# Patient Record
Sex: FEMALE | Race: WHITE | URBAN - METROPOLITAN AREA
[De-identification: names, ages, dates, MRNs, and addresses within clinical notes are randomized per-mention and may not be internally consistent; named-entity substitution may affect disease eponyms.]

---

## 2017-01-01 ENCOUNTER — APPOINTMENT (OUTPATIENT)
Dept: GENERAL RADIOLOGY | Facility: CLINIC | Age: 82
End: 2017-01-01
Attending: EMERGENCY MEDICINE
Payer: MEDICARE

## 2017-01-01 ENCOUNTER — HOSPITAL ENCOUNTER (OUTPATIENT)
Facility: CLINIC | Age: 82
Setting detail: OBSERVATION
Discharge: HOME | End: 2017-02-15
Attending: EMERGENCY MEDICINE | Admitting: HOSPITALIST
Payer: MEDICARE

## 2017-01-01 VITALS
RESPIRATION RATE: 16 BRPM | TEMPERATURE: 97.8 F | DIASTOLIC BLOOD PRESSURE: 83 MMHG | SYSTOLIC BLOOD PRESSURE: 117 MMHG | OXYGEN SATURATION: 97 %

## 2017-01-01 DIAGNOSIS — N39.0 URINARY TRACT INFECTION, SITE UNSPECIFIED: ICD-10-CM

## 2017-01-01 DIAGNOSIS — R62.7 FAILURE TO THRIVE IN ADULT: ICD-10-CM

## 2017-01-01 DIAGNOSIS — E87.8 HYPERCHLOREMIA: ICD-10-CM

## 2017-01-01 DIAGNOSIS — E87.0 HYPERNATREMIA: ICD-10-CM

## 2017-01-01 LAB
ALBUMIN UR-MCNC: 100 MG/DL
ANION GAP SERPL CALCULATED.3IONS-SCNC: 14 MMOL/L (ref 3–14)
APPEARANCE UR: ABNORMAL
BACTERIA #/AREA URNS HPF: ABNORMAL /HPF
BASOPHILS # BLD AUTO: 0 10E9/L (ref 0–0.2)
BASOPHILS NFR BLD AUTO: 0 %
BILIRUB UR QL STRIP: ABNORMAL
BUN SERPL-MCNC: 45 MG/DL (ref 7–30)
CALCIUM SERPL-MCNC: 8.4 MG/DL (ref 8.5–10.1)
CHLORIDE SERPL-SCNC: 116 MMOL/L (ref 94–109)
CO2 SERPL-SCNC: 29 MMOL/L (ref 20–32)
COLOR UR AUTO: ABNORMAL
CREAT SERPL-MCNC: 1.03 MG/DL (ref 0.52–1.04)
DIFFERENTIAL METHOD BLD: ABNORMAL
EOSINOPHIL # BLD AUTO: 0 10E9/L (ref 0–0.7)
EOSINOPHIL NFR BLD AUTO: 0 %
ERYTHROCYTE [DISTWIDTH] IN BLOOD BY AUTOMATED COUNT: 17.5 % (ref 10–15)
GFR SERPL CREATININE-BSD FRML MDRD: 50 ML/MIN/1.7M2
GLUCOSE SERPL-MCNC: 130 MG/DL (ref 70–99)
GLUCOSE UR STRIP-MCNC: NEGATIVE MG/DL
HCT VFR BLD AUTO: 48.7 % (ref 35–47)
HGB BLD-MCNC: 16.2 G/DL (ref 11.7–15.7)
HGB UR QL STRIP: ABNORMAL
IMM GRANULOCYTES # BLD: 0.1 10E9/L (ref 0–0.4)
IMM GRANULOCYTES NFR BLD: 0.4 %
KETONES UR STRIP-MCNC: 15 MG/DL
LEUKOCYTE ESTERASE UR QL STRIP: ABNORMAL
LYMPHOCYTES # BLD AUTO: 1.2 10E9/L (ref 0.8–5.3)
LYMPHOCYTES NFR BLD AUTO: 6.9 %
MCH RBC QN AUTO: 32.7 PG (ref 26.5–33)
MCHC RBC AUTO-ENTMCNC: 33.3 G/DL (ref 31.5–36.5)
MCV RBC AUTO: 98 FL (ref 78–100)
MONOCYTES # BLD AUTO: 0.4 10E9/L (ref 0–1.3)
MONOCYTES NFR BLD AUTO: 2.5 %
MUCOUS THREADS #/AREA URNS LPF: PRESENT /LPF
NEUTROPHILS # BLD AUTO: 15 10E9/L (ref 1.6–8.3)
NEUTROPHILS NFR BLD AUTO: 90.2 %
NITRATE UR QL: NEGATIVE
NRBC # BLD AUTO: 0 10*3/UL
NRBC BLD AUTO-RTO: 0 /100
PH UR STRIP: 5.5 PH (ref 5–7)
PLATELET # BLD AUTO: 230 10E9/L (ref 150–450)
POTASSIUM SERPL-SCNC: 3.5 MMOL/L (ref 3.4–5.3)
RBC # BLD AUTO: 4.96 10E12/L (ref 3.8–5.2)
RBC #/AREA URNS AUTO: ABNORMAL /HPF (ref 0–2)
SODIUM SERPL-SCNC: 159 MMOL/L (ref 133–144)
SP GR UR STRIP: 1.02 (ref 1–1.03)
URN SPEC COLLECT METH UR: ABNORMAL
UROBILINOGEN UR STRIP-ACNC: 4 EU/DL (ref 0.2–1)
WBC # BLD AUTO: 16.6 10E9/L (ref 4–11)
WBC #/AREA URNS AUTO: ABNORMAL /HPF (ref 0–2)

## 2017-01-01 PROCEDURE — 96360 HYDRATION IV INFUSION INIT: CPT

## 2017-01-01 PROCEDURE — 96361 HYDRATE IV INFUSION ADD-ON: CPT

## 2017-01-01 PROCEDURE — 81001 URINALYSIS AUTO W/SCOPE: CPT | Performed by: EMERGENCY MEDICINE

## 2017-01-01 PROCEDURE — 99217 ZZC OBSERVATION CARE DISCHARGE: CPT | Performed by: HOSPITALIST

## 2017-01-01 PROCEDURE — 85025 COMPLETE CBC W/AUTO DIFF WBC: CPT | Performed by: EMERGENCY MEDICINE

## 2017-01-01 PROCEDURE — 99285 EMERGENCY DEPT VISIT HI MDM: CPT | Mod: 25

## 2017-01-01 PROCEDURE — 71020 XR CHEST 2 VW: CPT

## 2017-01-01 PROCEDURE — A9270 NON-COVERED ITEM OR SERVICE: HCPCS | Mod: GY | Performed by: INTERNAL MEDICINE

## 2017-01-01 PROCEDURE — 25000132 ZZH RX MED GY IP 250 OP 250 PS 637: Mod: GY | Performed by: INTERNAL MEDICINE

## 2017-01-01 PROCEDURE — 25000128 H RX IP 250 OP 636: Performed by: INTERNAL MEDICINE

## 2017-01-01 PROCEDURE — 99220 ZZC INITIAL OBSERVATION CARE,LEVL III: CPT | Performed by: INTERNAL MEDICINE

## 2017-01-01 PROCEDURE — 93005 ELECTROCARDIOGRAM TRACING: CPT

## 2017-01-01 PROCEDURE — 80048 BASIC METABOLIC PNL TOTAL CA: CPT | Performed by: EMERGENCY MEDICINE

## 2017-01-01 PROCEDURE — 25000128 H RX IP 250 OP 636: Performed by: EMERGENCY MEDICINE

## 2017-01-01 PROCEDURE — G0378 HOSPITAL OBSERVATION PER HR: HCPCS

## 2017-01-01 RX ORDER — LORAZEPAM 0.5 MG/1
.5-1 TABLET ORAL
Status: DISCONTINUED | OUTPATIENT
Start: 2017-01-01 | End: 2017-01-01 | Stop reason: HOSPADM

## 2017-01-01 RX ORDER — SODIUM CHLORIDE 9 MG/ML
INJECTION, SOLUTION INTRAVENOUS CONTINUOUS
Status: DISCONTINUED | OUTPATIENT
Start: 2017-01-01 | End: 2017-01-01 | Stop reason: HOSPADM

## 2017-01-01 RX ORDER — SODIUM CHLORIDE 9 MG/ML
INJECTION, SOLUTION INTRAVENOUS ONCE
Status: COMPLETED | OUTPATIENT
Start: 2017-01-01 | End: 2017-01-01

## 2017-01-01 RX ORDER — FLUTICASONE PROPIONATE 50 MCG
1 SPRAY, SUSPENSION (ML) NASAL EVERY EVENING
Status: ON HOLD | COMMUNITY
End: 2017-01-01

## 2017-01-01 RX ORDER — ONDANSETRON 2 MG/ML
4 INJECTION INTRAMUSCULAR; INTRAVENOUS EVERY 6 HOURS PRN
Status: DISCONTINUED | OUTPATIENT
Start: 2017-01-01 | End: 2017-01-01 | Stop reason: HOSPADM

## 2017-01-01 RX ORDER — ONDANSETRON 4 MG/1
4 TABLET, ORALLY DISINTEGRATING ORAL EVERY 6 HOURS PRN
Status: DISCONTINUED | OUTPATIENT
Start: 2017-01-01 | End: 2017-01-01 | Stop reason: HOSPADM

## 2017-01-01 RX ORDER — ATROPINE SULFATE 10 MG/ML
1-2 SOLUTION/ DROPS OPHTHALMIC
Qty: 1 BOTTLE | DISCHARGE
Start: 2017-01-01

## 2017-01-01 RX ORDER — OLANZAPINE 5 MG/1
5 TABLET, ORALLY DISINTEGRATING ORAL EVERY 6 HOURS PRN
Qty: 30 TABLET | Refills: 0 | Status: SHIPPED | OUTPATIENT
Start: 2017-01-01

## 2017-01-01 RX ORDER — HYDROMORPHONE HYDROCHLORIDE 1 MG/ML
0.2 INJECTION, SOLUTION INTRAMUSCULAR; INTRAVENOUS; SUBCUTANEOUS
Status: DISCONTINUED | OUTPATIENT
Start: 2017-01-01 | End: 2017-01-01 | Stop reason: HOSPADM

## 2017-01-01 RX ORDER — BISACODYL 10 MG
10 SUPPOSITORY, RECTAL RECTAL
Qty: 30 SUPPOSITORY | DISCHARGE
Start: 2017-01-01

## 2017-01-01 RX ORDER — NALOXONE HYDROCHLORIDE 0.4 MG/ML
.1-.4 INJECTION, SOLUTION INTRAMUSCULAR; INTRAVENOUS; SUBCUTANEOUS
Status: DISCONTINUED | OUTPATIENT
Start: 2017-01-01 | End: 2017-01-01 | Stop reason: HOSPADM

## 2017-01-01 RX ORDER — BISACODYL 10 MG
10 SUPPOSITORY, RECTAL RECTAL
Status: DISCONTINUED | OUTPATIENT
Start: 2017-01-01 | End: 2017-01-01 | Stop reason: HOSPADM

## 2017-01-01 RX ORDER — ONDANSETRON 4 MG/1
4 TABLET, ORALLY DISINTEGRATING ORAL EVERY 8 HOURS PRN
COMMUNITY

## 2017-01-01 RX ORDER — ACETAMINOPHEN 650 MG/1
650 SUPPOSITORY RECTAL EVERY 6 HOURS PRN
Status: DISCONTINUED | OUTPATIENT
Start: 2017-01-01 | End: 2017-01-01 | Stop reason: HOSPADM

## 2017-01-01 RX ORDER — POLYETHYLENE GLYCOL 3350 17 G/17G
17 POWDER, FOR SOLUTION ORAL PRN
COMMUNITY

## 2017-01-01 RX ORDER — ATROPINE SULFATE 10 MG/ML
1-2 SOLUTION/ DROPS OPHTHALMIC
Status: DISCONTINUED | OUTPATIENT
Start: 2017-01-01 | End: 2017-01-01 | Stop reason: HOSPADM

## 2017-01-01 RX ORDER — CALCIUM CARBONATE 500 MG/1
1 TABLET, CHEWABLE ORAL PRN
Status: ON HOLD | COMMUNITY
End: 2017-01-01

## 2017-01-01 RX ORDER — LORAZEPAM 2 MG/ML
.5-1 INJECTION INTRAMUSCULAR
Status: DISCONTINUED | OUTPATIENT
Start: 2017-01-01 | End: 2017-01-01 | Stop reason: HOSPADM

## 2017-01-01 RX ORDER — ACETAMINOPHEN 325 MG/1
650 TABLET ORAL EVERY 6 HOURS PRN
Status: DISCONTINUED | OUTPATIENT
Start: 2017-01-01 | End: 2017-01-01 | Stop reason: HOSPADM

## 2017-01-01 RX ORDER — SENNOSIDES 8.6 MG
1 TABLET ORAL EVERY EVENING
COMMUNITY

## 2017-01-01 RX ORDER — ACETAMINOPHEN 650 MG/1
650 SUPPOSITORY RECTAL EVERY 6 HOURS PRN
Qty: 60 SUPPOSITORY | DISCHARGE
Start: 2017-01-01

## 2017-01-01 RX ORDER — ACETAMINOPHEN 325 MG/1
650 TABLET ORAL EVERY 6 HOURS PRN
Qty: 100 TABLET | DISCHARGE
Start: 2017-01-01

## 2017-01-01 RX ORDER — OLANZAPINE 5 MG/1
5 TABLET, ORALLY DISINTEGRATING ORAL EVERY 6 HOURS PRN
DISCHARGE
Start: 2017-01-01 | End: 2017-01-01

## 2017-01-01 RX ADMIN — SODIUM CHLORIDE: 9 INJECTION, SOLUTION INTRAVENOUS at 21:49

## 2017-01-01 RX ADMIN — SODIUM CHLORIDE: 9 INJECTION, SOLUTION INTRAVENOUS at 18:26

## 2017-01-01 RX ADMIN — LORAZEPAM 0.5 MG: 0.5 TABLET ORAL at 12:16

## 2017-01-01 ASSESSMENT — ACTIVITIES OF DAILY LIVING (ADL)
AMBULATION: 1-->ASSISTIVE EQUIPMENT
DRESS: 2-->ASSISTIVE PERSON
BATHING: 2-->ASSISTIVE PERSON
TOILETING: 2-->ASSISTIVE PERSON
NUMBER_OF_TIMES_PATIENT_HAS_FALLEN_WITHIN_LAST_SIX_MONTHS: 1
COGNITION: 2 - DIFFICULTY WITH ORGANIZING THOUGHTS
FALL_HISTORY_WITHIN_LAST_SIX_MONTHS: YES
TRANSFERRING: 2-->ASSISTIVE PERSON
RETIRED_COMMUNICATION: 2-->DIFFICULTY UNDERSTANDING AND SPEAKING (NOT RELATED TO LANGUAGE BARRIER)
RETIRED_EATING: 2-->ASSISTIVE PERSON
SWALLOWING: 2-->DIFFICULTY SWALLOWING LIQUIDS/FOODS

## 2017-01-01 ASSESSMENT — ENCOUNTER SYMPTOMS
WEAKNESS: 1
NAUSEA: 1
APPETITE CHANGE: 1
VOMITING: 1

## 2017-02-14 NOTE — IP AVS SNAPSHOT
[facility] [address] [facility]    [address] [place] [address]    Phone:  [phone]                                       After Visit Summary   2/14/2017    Jovanna Andrew    MRN: [id number]           After Visit Summary Signature Page     I have received my discharge instructions, and my questions have been answered. I have discussed any challenges I see with this plan with the nurse or doctor.    ..........................................................................................................................................  Patient/Patient Representative Signature      ..........................................................................................................................................  Patient Representative Print Name and Relationship to Patient    ..................................................               ................................................  Date                                            Time    ..........................................................................................................................................  Reviewed by Signature/Title    ...................................................              ..............................................  Date                                                            Time

## 2017-02-14 NOTE — IP AVS SNAPSHOT
MRN:[id number]                      After Visit Summary   2/14/2017    Jovanna Andrew    MRN: [id number]           Thank you!     Thank you for choosing [facility] for your care. Our goal is always to provide you with excellent care. Hearing back from our patients is one way we can continue to improve our services. Please take a few minutes to complete the written survey that you may receive in the mail after you visit with us. Thank you!        Patient Information     Date Of Birth          11/20/1924        About your hospital stay     You were admitted on:  February 15, 2017 You last received care in the:  [facility] [address] [facility]    You were discharged on:  February 15, 2017        Reason for your hospital stay       Hospice                  Who to Call     For medical emergencies, please call 911.  For non-urgent questions about your medical care, please call your primary care provider or clinic, None          Attending Provider     Provider Specialty    Hira Carlos MD Emergency Medicine    Charles Cai MD Internal Medicine    Alfredito, Rashad Pearce DO Internal Medicine       Primary Care Provider    None       No address on file        After Care Instructions     Activity - Up with nursing assistance           Advance Diet as Tolerated       Follow this diet upon discharge: As tolerated.            Fall precautions           General info for SNF       Length of Stay Estimate: Long Term Care  Condition at Discharge: Terminal  Level of care:board and care  Rehabilitation Potential: Poor  Admission H&P remains valid and up-to-date: Yes  Recent Chemotherapy: N/A  Use Nursing Home Standing Orders: No            Mantoux instructions       Give two-step Mantoux (PPD) Per Facility Policy Yes                  Follow-up Appointments     Follow Up and recommended labs and tests       Follow up with hospice, as directed.                  Pending Results     No orders  "found for last 3 day(s).            Statement of Approval     Ordered          02/15/17 1134  I have reviewed and agree with all the recommendations and orders detailed in this document.  EFFECTIVE NOW     Approved and electronically signed by:  Rashad Glaser DO             Admission Information     Date & Time Provider Department Dept. Phone    2017 Rashad Glaser DO [facility] [address] [facility] [phone]      Your Vitals Were     Blood Pressure Temperature Respirations Pulse Oximetry          117/83 (BP Location: Right arm) 97.8  F (36.6  C) (Axillary) 16 97%        MyChart Information     [facility] lets you send messages to your doctor, view your test results, renew your prescriptions, schedule appointments and more. To sign up, go to [email or web address] . Click on \"Log in\" on the left side of the screen, which will take you to the Welcome page. Then click on \"Sign up Now\" on the right side of the page.     You will be asked to enter the access code listed below, as well as some personal information. Please follow the directions to create your username and password.     Your access code is: [email or web address]  Expires: 2017 11:48 AM     Your access code will  in 90 days. If you need help or a new code, please call your [facility] clinic or [phone].        Care EveryWhere ID     This is your Care EveryWhere ID. This could be used by other organizations to access your [facility] medical records  [id number]           Review of your medicines      START taking        Dose / Directions    * acetaminophen 650 MG Suppository   Commonly known as:  TYLENOL   Used for:  Hypernatremia   Replaces:  TYLENOL PO        Dose:  650 mg   Place 1 suppository (650 mg) rectally every 6 hours as needed for mild pain or fever (temperature over 100? F )   Quantity:  60 suppository   Refills:  0       * acetaminophen 325 MG tablet   Commonly known as:  TYLENOL   Used for:  Hypernatremia "        Dose:  650 mg   Take 2 tablets (650 mg) by mouth every 6 hours as needed for mild pain or fever (temperature over 100? F )   Quantity:  100 tablet   Refills:  0       artificial saliva Soln solution   Used for:  Hypernatremia        Dose:  2 spray   Take 2 mLs (2 sprays) by mouth every hour as needed for dry mouth   Refills:  0       atropine 1 % ophthalmic solution   Used for:  Hypernatremia        Dose:  1-2 drop   Place 1-2 drops under the tongue every hour as needed for other (secretions)   Quantity:  1 Bottle   Refills:  0       bisacodyl 10 MG Suppository   Commonly known as:  DULCOLAX   Used for:  Hypernatremia        Dose:  10 mg   Start taking on:  2/18/2017   Place 1 suppository (10 mg) rectally once as needed for other (for no bowel movement for 72 hours)   Quantity:  30 suppository   Refills:  0       hypromellose-dextran Soln ophthalmic solution   Used for:  Hypernatremia        Dose:  1-2 drop   Place 1-2 drops into both eyes every 8 hours as needed for dry eyes   Refills:  0       OLANZapine zydis 5 MG ODT tab   Commonly known as:  zyPREXA   Used for:  Hypernatremia        Dose:  5 mg   Place 1 tablet (5 mg) under the tongue every 6 hours as needed for other (agitation/delirium/nausea)   Quantity:  30 tablet   Refills:  0       oxyCODONE 20 mg/mL (HIGH CONC) solution   Commonly known as:  ROXICODONE-INTENSOL   Used for:  Hypernatremia        Dose:  5-7.5 mg   Place 0.25-0.375 mLs (5-7.5 mg) under the tongue every 2 hours as needed for moderate to severe pain (or dyspnea)   Quantity:  30 mL   Refills:  0       * Notice:  This list has 2 medication(s) that are the same as other medications prescribed for you. Read the directions carefully, and ask your doctor or other care provider to review them with you.      CONTINUE these medicines which have NOT CHANGED        Dose / Directions    ondansetron 4 MG ODT tab   Commonly known as:  ZOFRAN-ODT        Dose:  4 mg   Take 4 mg by mouth every 8 hours  as needed for nausea   Refills:  0       polyethylene glycol Packet   Commonly known as:  MIRALAX/GLYCOLAX        Dose:  17 g   Take 17 g by mouth as needed for constipation   Refills:  0       sennosides 8.6 MG tablet   Commonly known as:  SENOKOT        Dose:  1 tablet   Take 1 tablet by mouth every evening   Refills:  0         STOP taking     calcium carbonate 500 MG chewable tablet   Commonly known as:  TUMS           CITALOPRAM HYDROBROMIDE PO           fluticasone 50 MCG/ACT spray   Commonly known as:  FLONASE           OMEPRAZOLE PO           PRESERVISION AREDS PO           TYLENOL PO   Replaced by:  acetaminophen 650 MG Suppository                Where to get your medicines      Some of these will need a paper prescription and others can be bought over the counter. Ask your nurse if you have questions.     Bring a paper prescription for each of these medications     OLANZapine zydis 5 MG ODT tab    oxyCODONE 20 mg/mL (HIGH CONC) solution       You don't need a prescription for these medications     acetaminophen 325 MG tablet    acetaminophen 650 MG Suppository    artificial saliva Soln solution    atropine 1 % ophthalmic solution    bisacodyl 10 MG Suppository    hypromellose-dextran Soln ophthalmic solution                Protect others around you: Learn how to safely use, store and throw away your medicines at www.disposemymeds.org.             Medication List: This is a list of all your medications and when to take them. Check marks below indicate your daily home schedule. Keep this list as a reference.      Medications           Morning Afternoon Evening Bedtime As Needed    * acetaminophen 650 MG Suppository   Commonly known as:  TYLENOL   Place 1 suppository (650 mg) rectally every 6 hours as needed for mild pain or fever (temperature over 100? F )                                * acetaminophen 325 MG tablet   Commonly known as:  TYLENOL   Take 2 tablets (650 mg) by mouth every 6 hours as needed for  mild pain or fever (temperature over 100? F )                                artificial saliva Soln solution   Take 2 mLs (2 sprays) by mouth every hour as needed for dry mouth                                atropine 1 % ophthalmic solution   Place 1-2 drops under the tongue every hour as needed for other (secretions)                                bisacodyl 10 MG Suppository   Commonly known as:  DULCOLAX   Place 1 suppository (10 mg) rectally once as needed for other (for no bowel movement for 72 hours)   Start taking on:  2/18/2017                                hypromellose-dextran Soln ophthalmic solution   Place 1-2 drops into both eyes every 8 hours as needed for dry eyes                                OLANZapine zydis 5 MG ODT tab   Commonly known as:  zyPREXA   Place 1 tablet (5 mg) under the tongue every 6 hours as needed for other (agitation/delirium/nausea)                                ondansetron 4 MG ODT tab   Commonly known as:  ZOFRAN-ODT   Take 4 mg by mouth every 8 hours as needed for nausea                                oxyCODONE 20 mg/mL (HIGH CONC) solution   Commonly known as:  ROXICODONE-INTENSOL   Place 0.25-0.375 mLs (5-7.5 mg) under the tongue every 2 hours as needed for moderate to severe pain (or dyspnea)                                polyethylene glycol Packet   Commonly known as:  MIRALAX/GLYCOLAX   Take 17 g by mouth as needed for constipation                                sennosides 8.6 MG tablet   Commonly known as:  SENOKOT   Take 1 tablet by mouth every evening                                * Notice:  This list has 2 medication(s) that are the same as other medications prescribed for you. Read the directions carefully, and ask your doctor or other care provider to review them with you.

## 2017-02-14 NOTE — IP AVS SNAPSHOT
[facility] [address] [facility]: [phone]                                              INTERAGENCY TRANSFER FORM - PHYSICIAN ORDERS   2017                    Hospital Admission Date: 2017  FAUSTINA ESCOBAR   : 1924  Sex: Female        Attending Provider: Rashad Glaser DO     Allergies:  No Known Allergies    Infection:  None   Service:  HOSPITALIST    Ht:  --   Wt:  --   Admission Wt:  --    BMI:  --   BSA:  --            Patient PCP Information     Provider PCP Type    None General      ED Clinical Impression     Diagnosis Description Comment Added By Time Added    Hypernatremia [E87.0] Hypernatremia [E87.0]  Hira Carlos MD 2017  7:59 PM    Hyperchloremia [E87.8] Hyperchloremia [E87.8]  Hira Carlos MD 2017  7:59 PM    Urinary tract infection, site unspecified [N39.0] Urinary tract infection, site unspecified [N39.0]  Hira Carlos MD 2017  7:59 PM    Failure to thrive in adult [R62.7] Failure to thrive in adult [R62.7]  Hira Carlos MD 2017  7:59 PM      Hospital Problems as of 2/15/2017              Priority Class Noted POA    Hospice care Medium  2/15/2017 Yes      Non-Hospital Problems as of 2/15/2017     None      Code Status History     Date Active Date Inactive Code Status Order ID Comments User Context    2/15/2017 11:34 AM  DNR/DNI [id number]  Rashad Glaser DO Outpatient    2017  9:17 PM 2/15/2017 11:34 AM DNR/DNI [id number] Code status discussion is appropriately documented in the chart. Charles Cai MD Inpatient         Medication Review      START taking        Dose / Directions Comments    * acetaminophen 650 MG Suppository   Commonly known as:  TYLENOL   Used for:  Hypernatremia   Replaces:  TYLENOL PO        Dose:  650 mg   Place 1 suppository (650 mg) rectally every 6 hours as needed for mild pain or fever (temperature over 100? F )   Quantity:  60 suppository   Refills:  0        *  acetaminophen 325 MG tablet   Commonly known as:  TYLENOL   Used for:  Hypernatremia        Dose:  650 mg   Take 2 tablets (650 mg) by mouth every 6 hours as needed for mild pain or fever (temperature over 100? F )   Quantity:  100 tablet   Refills:  0        artificial saliva Soln solution   Used for:  Hypernatremia        Dose:  2 spray   Take 2 mLs (2 sprays) by mouth every hour as needed for dry mouth   Refills:  0        atropine 1 % ophthalmic solution   Used for:  Hypernatremia        Dose:  1-2 drop   Place 1-2 drops under the tongue every hour as needed for other (secretions)   Quantity:  1 Bottle   Refills:  0        bisacodyl 10 MG Suppository   Commonly known as:  DULCOLAX   Used for:  Hypernatremia        Dose:  10 mg   Start taking on:  2/18/2017   Place 1 suppository (10 mg) rectally once as needed for other (for no bowel movement for 72 hours)   Quantity:  30 suppository   Refills:  0        hypromellose-dextran Soln ophthalmic solution   Used for:  Hypernatremia        Dose:  1-2 drop   Place 1-2 drops into both eyes every 8 hours as needed for dry eyes   Refills:  0        OLANZapine zydis 5 MG ODT tab   Commonly known as:  zyPREXA   Used for:  Hypernatremia        Dose:  5 mg   Place 1 tablet (5 mg) under the tongue every 6 hours as needed for other (agitation/delirium/nausea)   Quantity:  30 tablet   Refills:  0        oxyCODONE 20 mg/mL (HIGH CONC) solution   Commonly known as:  ROXICODONE-INTENSOL   Used for:  Hypernatremia        Dose:  5-7.5 mg   Place 0.25-0.375 mLs (5-7.5 mg) under the tongue every 2 hours as needed for moderate to severe pain (or dyspnea)   Quantity:  30 mL   Refills:  0        * Notice:  This list has 2 medication(s) that are the same as other medications prescribed for you. Read the directions carefully, and ask your doctor or other care provider to review them with you.      CONTINUE these medications which have NOT CHANGED        Dose / Directions Comments     ondansetron 4 MG ODT tab   Commonly known as:  ZOFRAN-ODT        Dose:  4 mg   Take 4 mg by mouth every 8 hours as needed for nausea   Refills:  0        polyethylene glycol Packet   Commonly known as:  MIRALAX/GLYCOLAX        Dose:  17 g   Take 17 g by mouth as needed for constipation   Refills:  0        sennosides 8.6 MG tablet   Commonly known as:  SENOKOT        Dose:  1 tablet   Take 1 tablet by mouth every evening   Refills:  0          STOP taking     calcium carbonate 500 MG chewable tablet   Commonly known as:  TUMS           CITALOPRAM HYDROBROMIDE PO           fluticasone 50 MCG/ACT spray   Commonly known as:  FLONASE           OMEPRAZOLE PO           PRESERVISION AREDS PO           TYLENOL PO   Replaced by:  acetaminophen 650 MG Suppository                   Summary of Visit     Reason for your hospital stay       Hospice             After Care     Activity - Up with nursing assistance           Advance Diet as Tolerated       Follow this diet upon discharge: As tolerated.       Fall precautions           General info for SNF       Length of Stay Estimate: Long Term Care  Condition at Discharge: Terminal  Level of care:board and care  Rehabilitation Potential: Poor  Admission H&P remains valid and up-to-date: Yes  Recent Chemotherapy: N/A  Use Nursing Home Standing Orders: No       Mantoux instructions       Give two-step Mantoux (PPD) Per Facility Policy Yes             Follow-Up Appointment Instructions     Future Labs/Procedures    Follow Up and recommended labs and tests     Comments:    Follow up with hospice, as directed.      Follow-Up Appointment Instructions     Follow Up and recommended labs and tests       Follow up with hospice, as directed.             Statement of Approval     Ordered          02/15/17 1134  I have reviewed and agree with all the recommendations and orders detailed in this document.  EFFECTIVE NOW     Approved and electronically signed by:  Rashad Glaser DO

## 2017-02-15 PROBLEM — Z51.5 HOSPICE CARE: Status: ACTIVE | Noted: 2017-01-01

## 2017-02-15 NOTE — ED NOTES
[facility]  ED Nurse Handoff Report    ED Chief complaint: Nausea & Vomiting (Light nausea and vomiting all day today. Staff got pt up to the bathroom and she had a syncopal episode last approx a mintue or two. )      ED Diagnosis:   Final diagnoses:   Hypernatremia   Hyperchloremia   Urinary tract infection, site unspecified   Failure to thrive in adult       Code Status: Son is POA    Allergies: No Known Allergies    Activity level:  Total Care     Needed?: No    Isolation: No  Infection: Not Applicable    Bariatric?: No      Vital Signs:   Vitals:    02/14/17 1811 02/14/17 1918 02/14/17 1919   BP: (!) 143/126 (!) 133/91    Resp: 18     Temp: 96.5  F (35.8  C)     TempSrc: Temporal     SpO2: 93%  96%       Cardiac Rhythm: ,        Pain level:      Is this patient confused?: Yes    Patient Report: Initial Complaint: Pt presents   Focused Assessment: Pt presents with nausea and weakness  Tests Performed:    Abnormal Results:    Treatments provided:      Family Comments: Son at bedside     OBS brochure/video discussed/provided to patient: N/A    ED Medications:   Medications   sodium chloride (PF) 0.9% PF flush 3 mL (not administered)   sodium chloride (PF) 0.9% PF flush 3 mL (not administered)   0.9% sodium chloride infusion ( Intravenous New Bag 2/14/17 1826)       Drips infusing?:  No      ED NURSE PHONE NUMBER: [phone]

## 2017-02-15 NOTE — PLAN OF CARE
Problem: Discharge Planning  Goal: Discharge Planning (Adult, OB, Behavioral, Peds)  Outcome: Adequate for Discharge Date Met:  02/15/17  Pt discharged with belongings via stretcher back to TCU.    Problem: Goal Outcome Summary  Goal: Goal Outcome Summary  Outcome: Adequate for Discharge Date Met:  02/15/17  Pt placed on comfort cares. Pt not taking anything orally, did receive ativan x1 for agitation during morning cares. Pt transferred back to TCU today and hospice to follow.

## 2017-02-15 NOTE — PROGRESS NOTES
Spoke with patient' son regarding observation status. Brochure given.  Also discussed possibility of patient returning to [facility] today and continue with plan for hospice tomorrow as scheduled. Patient is sleeping and appears comfortable. I did tell her son we would have Dr Glaser come speak with him  And see how he feels regarding discharge to her facility today.

## 2017-02-15 NOTE — PROGRESS NOTES
Care Transition Initial Assessment - SW     Met with: Patient and Family    Active Problems:    Hospice care         DATA  Lives With: facility resident            .   Identified issues/concerns regarding health management: return to [facility], hospice care.   Patient feels that they have adequate support @ home?  Yes: Family is comfortable with pt returning to [facility], where she receives total care services.                     ASSESSMENT  Cognitive Status:  Awake, lethargic.  Concerns to be addressed: .  Return to [facility], transport.     PLAN  Patient Goals and Preferences: [facility] memory care.  Patient anticipates discharging to:  [facility] memory care.  Family in agreement.    KEYSHAWN spoke with Holli RN at pt's [facility].  [phone].  She agrees pt is appropriate for comfort care.  Family plans to meet with [facility] tomorrow at 1pm to begin services.  Holli requests SW have pt's comfort meds be filled here and sent with pt.  [facility] arranged stretcher transport for pt to return to [facility] for 1400 today.  [facility], nursing, and family aware of time.  Stretcher transport arranged due to pt being comfort care, lethargic, and unable to tolerate sitting.  Pt has severe dementia.  PCS from completed and faxed to [facility].  Family is aware pt will receive a bill for transport if not covered by her insurance.    LUIS M Muniz [phone]

## 2017-02-15 NOTE — ED NOTES
Bed: ED24  Expected date: 2/14/17  Expected time: 5:52 PM  Means of arrival: Ambulance  Comments:  Rina 428 syncope 89 female

## 2017-02-15 NOTE — ED PROVIDER NOTES
History     Chief Complaint:  Nausea & Vomiting       HPI   History is limited due to the patient's age.   Jovanna Andrew is a 89 year old female with a past medical history of MI, high cholesterol, hypertension who presents via ambulance accompanied by son for evaluation of nausea and vomiting. Son reports the patient was seen at [facility] recently and was diagnosed with infection in her left salivary gland. She was given IV antibiotics at the hospital and was discharged two weeks ago. Since then the patient has been experiencing generalized weakness and has been refusing food. Son was with patient today at her [facility] care facility and states that she has been experiencing nausea and retching all day today. Staff got patient up to use the bathroom and she had a syncopal episode that lasted a minute or two. 911 was called and the patient had one episode of vomiting en route. Son reports that the patient has not been able to communicate verbally over the past six months.       Allergies:  NKDA    Medications:    Omeprazole  Amlodipine  Simvastatin  Celebrex  Norco    Past Medical History:    Arthritis  Chronic pain  High cholesterol  Hypertension  MI  Nausea  Cerebral artery occlusion     Past Surgical History:    Orthopedic surgery  Appendectomy  Cholecystectomy     Family History:    History reviewed.  No significant family history.     Social History:  Marital Status:     Smoking status: Former smoker  Alcohol use: Yes     Review of Systems   Constitutional: Positive for appetite change.   Gastrointestinal: Positive for nausea and vomiting.   Neurological: Positive for syncope and weakness.   All other systems reviewed and are negative.    Physical Exam   First Vitals:  BP: (!) 143/126  Temp: 96.5  F (35.8  C)  Resp: 18  SpO2: 93 %    Physical Exam  SKIN:  Warm, dry.  No area of erythema.  No rash.  Atraumatic.  No jaundice.  HEMATOLOGIC/IMMUNOLOGIC/LYMPHATIC:  No pallor or edema.  HENT:  Dry oral  mucosa.  No facial mass or inflammation.  EYES:  Conjunctivae normal.  Anicteric.  CARDIOVASCULAR:  Regular rate and rhythm.  No murmur.  RESPIRATORY:  No respiratory distress, breath sounds equal and normal.  GASTROINTESTINAL:  Soft, nontender abdomen.  No mass.  No distension.  MUSCULOSKELETAL:  Full painless upper and lower extremity passive range of motion.  NEUROLOGIC:  Alert, nonverbal.  Moves all limbs spontaneously.    Emergency Department Course   ECG @ 1813  Indication: Syncopal episode  Rate 106 bpm.   MI interval * ms.   QRS duration 76 ms.   QT/QTc 366/486 ms.   P-R-T axes -31.  Notes: Sinus rhythm with frequent and consecutive premature ventricular complexes. Left axis deviation. Poor quality EKG.  N STEMI.  Time read 1822      Imaging:  Radiographic findings were communicated with the patient's son who voiced understanding of the findings.    XR Chest 2 Views  Preliminary Result  IMPRESSION: No acute cardiopulmonary disease. There is likely a faint  skin fold projected over the left lateral mid to low chest.      Laboratory:  CBC: WBC 16.6 (H) HGB 16.2 (H)  (WNL)   BMP: Cr 1.03 (WNL) Glucose 130 (H)  (H) Chloride 116 (H), BUN 45 (H) GFR 50 (L) Calcium 8.4 (L) Rest WNL  UA: Cloudy, orange urine; Urinebilin Large (A) Urineketon 15 (A) Rest WNL  Urine microscopic: WBC  (A), RBC 2-5 (A) Bacteria Many (A) Mucous Present (A)    Interventions:  1826: Normal saline, 125 ml/hr, IV    ED Course:  The patient arrived by ambulance. She was escorted to the ED where her vitals were measured and recorded.   Nursing notes and past medical history reviewed.   I performed a physical examination of the patient as documented above.  I explained the plan with the patient's son who consents to this.   The above workups were undertaken.   The patient was placed on a cardiac monitor.   1952: Son was updated.   2014: The patient was discussed with Dr. Cai of hospitalist service.   I personally reviewed the  laboratory and imaging results with the Patient's son and answered all related questions prior to admission.  Findings and plan explained to the son who consents to admission. Discussed the patient with Dr. Cai, who will admit the patient to a medical bed for further monitoring, evaluation, and treatment.     Impression & Plan      Medical Decision Making:  Jovanna Andrew is a 89 year old female who presents with a number of symptoms, some subacute to chronic. Screening test revealed metabolic derangement which would need to be corrected. Otherwise signs of UTI as well. She will be admitted for treatment.     Diagnosis:    ICD-10-CM   1. Hypernatremia E87.0   2. Hyperchloremia E87.8   3. Urinary tract infection, site unspecified N39.0   4. Failure to thrive in adult R62.7         Disposition:   Admit to a medical bed under the care of Dr. Cai.     Modesta CARRION, am serving as a scribe on 2/14/2017 at 6:19 PM to personally document services performed by Hira Carlos MD, based on my observations and the provider's statements to me.         Hira Carlos MD  02/15/17 9219

## 2017-02-15 NOTE — DISCHARGE SUMMARY
[facility]    Discharge Summary  Hospitalist    Date of Admission:  2/14/2017  Date of Discharge:  2/15/2017  Discharging Provider: Rashad Glaser  Date of Service (when I saw the patient): 02/15/17    Discharge Diagnoses      Hypernatremia  Hyperchloremia  Urinary tract infection, site unspecified  Failure to thrive in adult    History of Present Illness   Jovanna Andrew is a 89-year-old female with a history of severe dementia, hypertension, hyperlipidemia and GERD. She lives in a memory care unit. She was hospitalized at [facility] from 01/17 through 1/20/2017 for treatment of acute sialoadenitis. She was treated with a 2 weeks course of IV Unasyn. She was brought to [facility] ED tonight by her son Cornelio for evaluation of nausea and vomiting. Since discharge from [facility], the patient has had progressively worsening generalized weakness. She refused to eat, does not communicate well. Has been having intermittent nausea and vomiting.  Evaluation in the ED showed UTI and hypernatremia with sodium of 159. Patient was initiated on IV fluid hydration. Cornelio states he has 5 other siblings. He apparently talked to couple of his siblings tonight and they have all decided to make their mother comfort care. Cornelio tells me that his mother was scheduled to meet with Hospice Service on Thursday, which is 2 days from now.     Hospital Course   Jovanna Andrew was admitted on 2/14/2017.  The following problems were addressed during her hospitalization:    Physical deconditioning and failure to thrive with frailty syndrome: Patient with gradual decline according to family.    - Hospice services and cares initiated following detailed conversation with family and medical power of .     Pending Results   These results will be followed up by Hospice services  Unresulted Labs Ordered in the Past 30 Days of this Admission     No orders found  for last 61 day(s).          Code Status   Comfort Care       Primary Care Physician   None    Physical Exam   Temp: 97.8  F (36.6  C) Temp src: Axillary BP: 117/83   Heart Rate: 85 Resp: 16 SpO2: 97 % O2 Device: None (Room air)    There were no vitals filed for this visit.  Vital Signs with Ranges  Temp:  [96.5  F (35.8  C)-97.8  F (36.6  C)] 97.8  F (36.6  C)  Heart Rate:  [77-92] 85  Resp:  [15-18] 16  BP: (105-143)/() 117/83  SpO2:  [93 %-99 %] 97 %       GENERAL: NAD. Minimal interaction, confused.   HEENT: Normocephalic, atraumatic. Nares normal.   NECK: Supple. No lymphadenopathy.  LUNGS: Clear to auscultation, minimal decrement to bases. No dyspnea at rest.   HEART: Extremities perfused.   ABDOMEN: Soft, nontender, and nondistended. Positive bowel sounds.   EXTREMITIES: Mild edema noted.   NEUROLOGIC: Does not follow commands. Moves extremities spontaneously. Confused, minimal verbal interaction.     Discharge Disposition   Discharged to hospice care  Condition at discharge: Terminal    Consultations This Hospital Stay   SOCIAL WORK IP CONSULT  SOCIAL WORK IP CONSULT    Time Spent on this Encounter   I, Rashad Glaser, personally saw the patient today and spent greater than 30 minutes discharging this patient.    Discharge Orders     General info for SNF   Length of Stay Estimate: Long Term Care  Condition at Discharge: Terminal  Level of care:board and care  Rehabilitation Potential: Poor  Admission H&P remains valid and up-to-date: Yes  Recent Chemotherapy: N/A  Use Nursing Home Standing Orders: No     Mantoux instructions   Give two-step Mantoux (PPD) Per Facility Policy Yes     Reason for your hospital stay   Hospice     Activity - Up with nursing assistance     Follow Up and recommended labs and tests   Follow up with hospice, as directed.     DNR/DNI     Fall precautions     Advance Diet as Tolerated   Follow this diet upon discharge: As tolerated.       Discharge Medications   Current  Discharge Medication List      START taking these medications    Details   oxyCODONE (ROXICODONE-INTENSOL) 20 mg/mL (HIGH CONC) solution Place 0.25-0.375 mLs (5-7.5 mg) under the tongue every 2 hours as needed for moderate to severe pain (or dyspnea)  Refills: 0    Associated Diagnoses: Hypernatremia      acetaminophen (TYLENOL) 650 MG Suppository Place 1 suppository (650 mg) rectally every 6 hours as needed for mild pain or fever (temperature over 100  F )  Qty: 60 suppository    Associated Diagnoses: Hypernatremia      OLANZapine zydis (ZYPREXA) 5 MG ODT tab Place 1 tablet (5 mg) under the tongue every 6 hours as needed for other (agitation/delirium/nausea)    Associated Diagnoses: Hypernatremia      bisacodyl (DULCOLAX) 10 MG Suppository Place 1 suppository (10 mg) rectally once as needed for other (for no bowel movement for 72 hours)  Qty: 30 suppository    Associated Diagnoses: Hypernatremia      artificial saliva (BIOTENE MT) SOLN solution Take 2 mLs (2 sprays) by mouth every hour as needed for dry mouth    Associated Diagnoses: Hypernatremia      atropine 1 % ophthalmic solution Place 1-2 drops under the tongue every hour as needed for other (secretions)  Qty: 1 Bottle    Associated Diagnoses: Hypernatremia      hypromellose-dextran (ARTIFICAL TEARS) SOLN ophthalmic solution Place 1-2 drops into both eyes every 8 hours as needed for dry eyes    Associated Diagnoses: Hypernatremia         CONTINUE these medications which have CHANGED    Details   acetaminophen (TYLENOL) 325 MG tablet Take 2 tablets (650 mg) by mouth every 6 hours as needed for mild pain or fever (temperature over 100  F )  Qty: 100 tablet    Associated Diagnoses: Hypernatremia         CONTINUE these medications which have NOT CHANGED    Details   polyethylene glycol (MIRALAX/GLYCOLAX) Packet Take 17 g by mouth as needed for constipation      ondansetron (ZOFRAN-ODT) 4 MG ODT tab Take 4 mg by mouth every 8 hours as needed for nausea       sennosides (SENOKOT) 8.6 MG tablet Take 1 tablet by mouth every evening         STOP taking these medications       calcium carbonate (TUMS) 500 MG chewable tablet Comments:   Reason for Stopping:         CITALOPRAM HYDROBROMIDE PO Comments:   Reason for Stopping:         fluticasone (FLONASE) 50 MCG/ACT spray Comments:   Reason for Stopping:         Multiple Vitamins-Minerals (PRESERVISION AREDS PO) Comments:   Reason for Stopping:         OMEPRAZOLE PO Comments:   Reason for Stopping:             Allergies   No Known Allergies  Data   Most Recent 3 CBC's:  Recent Labs   Lab Test  02/14/17 1824 07/11/12   2230   WBC  16.6*  7.8   HGB  16.2*  13.2   MCV  98  92   PLT  230  345      Most Recent 3 BMP's:  Recent Labs   Lab Test  02/14/17 1824 07/11/12   2230   NA  159*  135   POTASSIUM  3.5  3.5   CHLORIDE  116*  98   CO2  29  29   BUN  45*  21   CR  1.03  0.93   ANIONGAP  14  7   TU  8.4*  8.8   GLC  130*  101*     Most Recent 2 LFT's:No lab results found.  Most Recent INR's and Anticoagulation Dosing History:  Anticoagulation Dose History     There is no flowsheet data to display.        Most Recent 3 Troponin's:  Recent Labs   Lab Test  07/11/12   2230   TROPI  <0.012     Most Recent Cholesterol Panel:No lab results found.  Most Recent 6 Bacteria Isolates From Any Culture (See EPIC Reports for Culture Details):  Recent Labs   Lab Test  07/12/12   0120   CULT  >100,000 colonies/mL Proteus mirabilis     Most Recent TSH, T4 and A1c Labs:No lab results found.  Results for orders placed or performed during the hospital encounter of 02/14/17   XR Chest 2 Views    Narrative    CHEST TWO VIEWS   2/14/2017 6:51 PM     HISTORY: Generalized weakness.    COMPARISON: None.      Impression    IMPRESSION: No acute cardiopulmonary disease. There is likely a faint  skin fold projected over the left lateral mid to low chest.    JOAQUIN CARABALLO MD

## 2017-02-15 NOTE — PHARMACY-ADMISSION MEDICATION HISTORY
Admission medication history interview status for the 2/14/2017  admission is complete. See EPIC admission navigator for prior to admission medications     Medication history source reliability:Good    Actions taken by pharmacist (provider contacted, etc): consulted med list from memory care facility     Additional medication history information not noted on PTA med list :None    Medication reconciliation/reorder completed by provider prior to medication history? No    Time spent in this activity: 15 minutes    Prior to Admission medications    Medication Sig Last Dose Taking? Auth Provider   Acetaminophen (TYLENOL PO) Take 1,000 mg by mouth 3 times daily  Yes Unknown, Entered By History   calcium carbonate (TUMS) 500 MG chewable tablet Take 1 chew tab by mouth as needed for heartburn  Yes Unknown, Entered By History   CITALOPRAM HYDROBROMIDE PO Take 10 mg by mouth daily  Yes Unknown, Entered By History   fluticasone (FLONASE) 50 MCG/ACT spray Spray 1 spray into both nostrils every evening  Yes Unknown, Entered By History   polyethylene glycol (MIRALAX/GLYCOLAX) Packet Take 17 g by mouth as needed for constipation prn med Yes Unknown, Entered By History   ondansetron (ZOFRAN-ODT) 4 MG ODT tab Take 4 mg by mouth every 8 hours as needed for nausea prn med Yes Unknown, Entered By History   Multiple Vitamins-Minerals (PRESERVISION AREDS PO) Take 1 tablet by mouth 2 times daily  Yes Unknown, Entered By History   sennosides (SENOKOT) 8.6 MG tablet Take 1 tablet by mouth every evening  Yes Unknown, Entered By History   OMEPRAZOLE PO Take 20 mg by mouth 2 times daily (before meals)   Yes Reported, Patient   Aniyah Rice, PearlD

## 2017-02-15 NOTE — PLAN OF CARE
Problem: Goal Outcome Summary  Goal: Goal Outcome Summary  Outcome: No Change  Pt A/Ox1, oriented to self. Alert, attempting to climb out of bed. Assist x2/lift, fall risk. VSS on RA. Comfort cares. IVF to TKO. No signs of pain. D/C pending progress.

## 2017-02-15 NOTE — H&P
DATE OF SERVICE:  2017      PRIMARY CARE PHYSICIAN:  Unknown.      CHIEF COMPLAINT:  Nausea and vomiting.      HISTORY OF PRESENT ILLNESS:  Jovanna Andrew is a 89-year-old female with a history of severe dementia, hypertension, hyperlipidemia and GERD.  She lives in a memory care unit.  She was hospitalized at [facility] from  through 2017 for treatment of acute sialoadenitis.  She was treated with a 2 weeks course of IV Unasyn.  She was brought to [facility] ED tonight by her son Cornelio for evaluation of nausea and vomiting.  Since discharge from [facility], the patient has had progressively worsening generalized weakness.  She refused to eat,  does not communicate well.  Has been having intermittent nausea and vomiting.  Evaluation in the ED showed UTI and hypernatremia with sodium of 159.  Patient was initiated on IV fluid hydration.  Cornelio states he has 5 other siblings. He apparently talked to couple of his siblings tonight and they have all decided to make their mother comfort care.  Cornelio tells me that his mother was scheduled to meet with Hospice Service on Thursday, which is 2 days from now.        PAST MEDICAL HISTORY:   1.  Severe dementia.  Lives in a memory care unit.   2.  Recent treatment for acute sialoadenitis.   3.  History of recurrent falls.   4.  Rib contusion.   5.  Perennial allergic rhinitis.   6.  GERD.   7.  History of recurrent urinary tract infection.   8.  Cognitive decline.   9.  Sinus arrhythmia.   10.  History of orthostatic hypotension.   11.  Constipation.   12.  DJD.   13.  Cerebral aneurysm, nonruptured.   14.  Cholecystectomy.   15.  Hyperlipidemia.   16.  Hypertension.   17.  Insomnia.   18.  History of recurrent dizziness.   19.   section.   20.  Bilateral hip replacement.   21.  Appendectomy.   22.  Right total knee arthroplasty.   23.  Left total hip arthroplasty revision.   24.  Cataract removal.       ALLERGIES:  No known drug allergies.      OUTPATIENT MEDICATIONS:   1.  Tylenol.   2.  Citalopram 10 mg p.o. daily.   3.  Flonase.   4.  MiraLax.   5.  Multivitamins.   6.  Senokot.   7.  Omeprazole 20 mg p.o. b.i.d.      SOCIAL HISTORY:  No tobacco, alcohol or IV drug use.      FAMILY HISTORY:  Reviewed and noncontributory to patient's current admission.      REVIEW OF SYSTEMS:  Unable to obtain due to patient's underlying severe dementia.      PHYSICAL EXAMINATION:   VITAL SIGNS:  Temperature 96.5, blood pressure 105/56, heart rate 77, respiration 15, 93% saturation on room air.   GENERAL:  Awake, does not follow simple commands, chronically debilitated, elderly female, in no apparent distress.   HEENT:  Normocephalic, atraumatic.  Pupils equal, round, reactive to light.   NECK:  Supple, oropharynx is clear, mucous membranes moist.  No thyromegaly.   CARDIOVASCULAR:  Normal S1, S2, no murmurs, rubs or gallops.   LUNGS:  Clear to auscultation bilaterally.   ABDOMEN:  Soft, good bowel sounds, nontender, no rebound or guarding.   EXTREMITIES:  No cyanosis or clubbing.   LYMPHATICS:  No edema.   NEUROLOGIC:  Nonfocal.   SKIN:  No rash.   MUSCULOSKELETAL:  No calf tenderness to palpation.   PSYCHIATRIC:  Mood and affect are appropriate.      Chest x-ray impression:  No acute cardiopulmonary disease.  There is likely a faint skin fold projected over left lateral mid to low chest.      LABORATORY DATA:  Sodium 159, potassium 3.5, chloride 116, carbon dioxide 29, BUN 45, creatinine 1.03, glucose 130, calcium 8.4.  WBC 16.6, hemoglobin 16.2 and platelet count 130,000.      Urinalysis showed small leukocyte esterase,  WBC and many bacteria.      IMPRESSION:   1.  Severe dementia.   2.  Urinary  tract infection.   3.  Hypernatremia.   4.  Generalized weakness.   5.  Failure to thrive.   6.  Hypertension.     7.  Dehydration.     PLAN:  Jovanna Andrew has severe dementia and lives in a memory care unit.  Her  son Cornelio tells me that his mother was scheduled to enroll into hospice service on Thursday, 2017, which is 2 days from now.  He says he discussed his mother's condition with his siblings and they all agreed to make her comfort care tonight.  They do not wish any restorative intervention including IV fluid or antibiotics.  She will be admitted to the medical [facility] with comfort care measures only.  I have ordered oxycodone intensol, IV Dilaudid, Ativan, Zyprexa   artificial tears and atropine ophthalmic solution.  Will consult with  in am.         AMAURY TORRES MD             D: 2017 21:35   T: 2017 22:06   MT: ROBERT      Name:     FAUSTINA ESCOBAR   MRN:      [id number]        Account:      [id number]   :      1924           Admitted:     [id number]      Document: [id number]

## 2017-05-19 ENCOUNTER — DOCUMENTATION ONLY (OUTPATIENT)
Dept: OTHER | Facility: CLINIC | Age: 82
End: 2017-05-19

## 2017-05-19 DIAGNOSIS — Z71.89 ACP (ADVANCE CARE PLANNING): Chronic | ICD-10-CM
